# Patient Record
Sex: MALE | Race: BLACK OR AFRICAN AMERICAN | NOT HISPANIC OR LATINO | URBAN - METROPOLITAN AREA
[De-identification: names, ages, dates, MRNs, and addresses within clinical notes are randomized per-mention and may not be internally consistent; named-entity substitution may affect disease eponyms.]

---

## 2022-08-21 ENCOUNTER — OUTPATIENT (OUTPATIENT)
Dept: OUTPATIENT SERVICES | Facility: HOSPITAL | Age: 22
LOS: 1 days | End: 2022-08-21

## 2022-08-21 DIAGNOSIS — Z20.822 CONTACT WITH AND (SUSPECTED) EXPOSURE TO COVID-19: ICD-10-CM

## 2022-08-21 LAB — SARS-COV-2 RNA SPEC QL NAA+PROBE: SIGNIFICANT CHANGE UP

## 2024-05-27 ENCOUNTER — EMERGENCY (EMERGENCY)
Facility: HOSPITAL | Age: 24
LOS: 1 days | Discharge: HOME | End: 2024-05-27
Admitting: STUDENT IN AN ORGANIZED HEALTH CARE EDUCATION/TRAINING PROGRAM
Payer: MEDICAID

## 2024-05-27 VITALS
HEART RATE: 61 BPM | OXYGEN SATURATION: 99 % | DIASTOLIC BLOOD PRESSURE: 74 MMHG | SYSTOLIC BLOOD PRESSURE: 132 MMHG | TEMPERATURE: 98 F | RESPIRATION RATE: 16 BRPM | HEIGHT: 69 IN | WEIGHT: 128.97 LBS

## 2024-05-27 PROCEDURE — 99284 EMERGENCY DEPT VISIT MOD MDM: CPT

## 2024-05-27 RX ORDER — OXYCODONE AND ACETAMINOPHEN 5; 325 MG/1; MG/1
1 TABLET ORAL
Qty: 6 | Refills: 0
Start: 2024-05-27

## 2024-05-27 RX ORDER — IBUPROFEN 200 MG
1 TABLET ORAL
Qty: 28 | Refills: 0
Start: 2024-05-27 | End: 2024-06-02

## 2024-05-27 RX ADMIN — Medication 1 TABLET(S): at 01:36

## 2024-05-27 NOTE — ED PROVIDER NOTE - PATIENT PORTAL LINK FT
You can access the FollowMyHealth Patient Portal offered by [facility] by registering at the following website: [email or web address]. By joining [facility]’s FollowMyHealth portal, you will also be able to view your health information using other applications (apps) compatible with our system.

## 2024-05-27 NOTE — ED PROVIDER NOTE - NSFOLLOWUPINSTRUCTIONS_ED_ALL_ED_FT
Tooth Injuries  Tooth injuries include cracked or broken teeth, teeth that have been dislodged or moved out of place, and teeth that have been knocked out of the mouth.    Severe tooth injuries need to be treated quickly to save the tooth. However, sometimes it is not possible to save a tooth after an injury, and the tooth may need to be removed.    What are the causes?  Tooth injuries may be caused by any force that is strong enough to chip, break, dislodge, or knock out a tooth. The injuries may come from:  Sports accidents.  Falls.  Fights.  What increases the risk?  The following factors may make you more likely to lose a tooth:  Playing contact sports, such as football or boxing, without using a mouth guard.  Any medical condition that increases the risk of falling or fainting.  Anything that causes injury to the face.  Any condition that reduces the support of the root of the tooth.  What are the signs or symptoms?  Symptoms of this condition include a tooth that:  May have moved out of position.  May have moved into or out of the tooth socket.  May not be visible in the gums, if the fracture was severe.  Other symptoms of a tooth injury include:  Pain, especially when chewing.  A loose tooth.  Bleeding in or around the tooth.  Swelling or bruising near the tooth.  Swelling or bruising of the lip over the injured tooth.  Increased tooth sensitivity to heat and cold.  A tooth that is knocked out of its place in the gum.  How is this diagnosed?  A tooth injury can be diagnosed with a complete history and a physical exam. You may also need dental X-rays to check for injuries to the root of the tooth.    How is this treated?  Treatment depends on the type of injury and its severity. Treatment may need to be done quickly to save your tooth. Possible treatments include:  Replacing a tooth fragment with a filling, a cap, or a hard, protective cover (crown). This may be an option for a chip or fracture that does not affect the inside of your tooth.  Repairing the inside of the tooth (root canal), if the dentist thinks it is necessary.  The root canal usually needs to be done within a few days of the injury. This may be done to treat a tooth fracture that affects the pulp.  Repositioning a dislodged tooth.  Using a brace or splint to hold the tooth in place.  Replacing a knocked-out tooth in the socket, if possible, and then doing a root canal.  Extracting a tooth. This is done for a fracture that extends below the gums or a fracture that splits the tooth completely.  Taking medicine, including:  Pain medicine.  Antibiotic medicine to help prevent infection.  Follow these instructions at home:  Medicines    Take over-the-counter and prescription medicines only as told by your dentist.  Take your antibiotic medicine as told by your dentist. Do not stop taking the antibiotic even if you start to feel better.  Do not drive or use heavy machinery while taking prescription pain medicine.  Caring for your teeth      Do not eat or chew on very hard objects. These include ice cubes, pens, pencils, hard candy, and popcorn kernels.  Do not clench or grind your teeth. Tell your dentist if you grind your teeth while you sleep.  Brush your teeth gently as directed by your dentist.  Do not use your teeth to open packages.  Always wear mouth protection when you play contact sports.  Managing pain and swelling    Gargle with a mixture of salt and water 3–4 times a day or as needed. To make salt water, completely dissolve ½–1 tsp (3–6 g) of salt in 1 cup (237 mL) of warm water.  If directed, apply ice to your mouth near the injured tooth:  Put ice in a plastic bag.  Place a towel between your skin and the bag.  Leave the ice on for 20 minutes, 2–3 times a day.  Remove the ice if your skin turns bright red. This is very important. If you cannot feel pain, heat, or cold, you have a greater risk of damage to the area.  General instructions    Do not use any products that contain nicotine or tobacco. These products include cigarettes, chewing tobacco, and vaping devices, such as e-cigarettes. If you need help quitting, ask your health care provider.  Your health care provider may recommend that you eat certain foods. This may include eating only soft foods.  Check the injured area every day for signs of infection. Watch for:  Redness, swelling, or pain.  Fluid, blood, or pus.  Keep all follow-up visits. This is important.  Contact a dental care provider if:  Your pain gets much worse, even after you take pain medicine.  You have pus coming from the site of the tooth injury.  You develop swelling near your injured tooth.  You have a tooth splint, and it becomes loose.  Your tooth becomes loose.  Get help right away if:  You have swelling in the face.  You have a fever.  You have bleeding near the tooth that does not stop in 10 minutes.  You have trouble swallowing.  You have trouble opening your mouth.  Your permanent tooth comes out after it is repositioned.    Summary  Tooth injuries include cracked or broken teeth and teeth that have been dislodged or knocked out of the mouth.  A tooth injury can be diagnosed with a medical history and a physical exam. You may also need dental X-rays to check for injuries to the root of the tooth.  Treatment depends on the type of injury you have and its severity. Treatment may need to be done quickly to save your tooth.  This information is not intended to replace advice given to you by your health care provider. Make sure you discuss any questions you have with your health care provider.

## 2024-05-27 NOTE — ED ADULT NURSE NOTE - NS ED NURSE RECORD ANOTHER VITAL SIGN
[FreeTextEntry1] :  Complete 10 days of antibiotics. Use antipyretics as needed. After being on antibiotics for at least 24 hours patient less likely to spread infection.\par  Yes

## 2024-05-27 NOTE — ED ADULT NURSE NOTE - OBJECTIVE STATEMENT
Pt arrives to ED intake 10B, A&Ox4, ambulatory at baseline. Pt C/O right sided molar pain x 2 weeks. Pt states it is difficult to eat food and hurts in a resting position as well. Swelling observed on right side of jaw. pt moaning and grunting upon assessment. States pain is 10/10. Airway is intact, pt speaking in complete sentences, no drooling observed at the mouth. Respirations are even and unlabored, abdomen is soft and nondistended, capillary refill is 2 seconds bilaterally. Medicated as per EMAR. Bed in lowest position, comfort measures provided, safety maintained.

## 2024-05-27 NOTE — ED ADULT TRIAGE NOTE - CHIEF COMPLAINT QUOTE
Pt c/o toothache x1 week, accompanied with headache, nausea, and vomiting. No past medical history. Airway is patent, speaking in clear and coherent sentences.

## 2024-05-27 NOTE — ED PROVIDER NOTE - OBJECTIVE STATEMENT
22 y/o M with no PMH presents c/o tooth pain worsening over 2 weeks. Patient notes that he cracked one of his lower left molars 2 weeks earlier and since that time he has been having worsening pain to the area. The pain is to the point that it is spreading down his neck and up his face to his head and making it difficult for him to sleep. He has tried using orajel and taking tylenol and motrin but without sufficient relief of his symptoms. He tried going to [facility] for the same concern but said that they wouldn't see him because he didn't have insurance. Denies any other complaints or concerns. Denies chest pain, SOB, cough, fevers, chills, abdominal pain, N/V/D/C, urinary complaints, dizziness, numbness, tingling, weakness, falls, sick contacts, recent travel.

## 2024-05-27 NOTE — ED PROVIDER NOTE - CLINICAL SUMMARY MEDICAL DECISION MAKING FREE TEXT BOX
22 y/o M with no PMH presents c/o tooth pain worsening over 2 weeks. Patient will require removal of the broken tooth for definitive relief but will provide additional pain medication at this time as well as abx for treatment. Percocet and augmentin ordered.  Will provide the patient with clinic information for tooth extraction.  Patient comfortable and stable for discharge with pcp and dental clinic follow up.  Instructed to return to the ED immediately for any worsening symptoms or new concerns.    PLAN AND FOLLOW-UP: Patient counseled on all findings, diagnosis and treatment plan. Patient's questions and concerns addressed. Patient stable, discharged with instructions to follow up with PMD, and to return to ED at any time for worsening symptoms or any other concerns. Patient demonstrates understanding of the findings and the importance of appropriate follow up care.

## 2024-05-27 NOTE — ED PROVIDER NOTE - PHYSICAL EXAMINATION
CONSTITUTIONAL: Comfortable; in no acute distress. Non-toxic appearing.   NEURO: Alert & oriented. Sensory and motor functions are grossly intact.  PSYCH: Mood appropriate. Thought processes intact.   HEENT: NCAT; (+) crack in tooth #32 with some surrounding redness and mild swelling but no signs of abscess or drainage.  CARD: Regular rate and rhythm, no murmurs  RESP: No accessory muscle use; breath sounds clear and equal bilaterally; no wheezes, rhonchi, or rales     MUSCULOSKELETAL/EXTREMITIES: FROM in all four extremities; no extremity edema.  SKIN: Warm; dry; no apparent lesions or exudate

## 2024-06-05 ENCOUNTER — APPOINTMENT (OUTPATIENT)
Age: 24
End: 2024-06-05
